# Patient Record
Sex: FEMALE | Race: WHITE | Employment: PART TIME | URBAN - METROPOLITAN AREA
[De-identification: names, ages, dates, MRNs, and addresses within clinical notes are randomized per-mention and may not be internally consistent; named-entity substitution may affect disease eponyms.]

---

## 2024-06-15 ENCOUNTER — HOSPITAL ENCOUNTER (EMERGENCY)
Facility: HOSPITAL | Age: 69
Discharge: HOME | End: 2024-06-15
Attending: EMERGENCY MEDICINE
Payer: MEDICARE

## 2024-06-15 ENCOUNTER — APPOINTMENT (EMERGENCY)
Dept: CT IMAGING | Facility: HOSPITAL | Age: 69
End: 2024-06-15
Payer: MEDICARE

## 2024-06-15 ENCOUNTER — OFFICE VISIT (OUTPATIENT)
Dept: URGENT CARE | Facility: MEDICAL CENTER | Age: 69
End: 2024-06-15
Payer: MEDICARE

## 2024-06-15 VITALS
HEART RATE: 77 BPM | RESPIRATION RATE: 20 BRPM | WEIGHT: 174.16 LBS | SYSTOLIC BLOOD PRESSURE: 110 MMHG | DIASTOLIC BLOOD PRESSURE: 55 MMHG | TEMPERATURE: 97.8 F | OXYGEN SATURATION: 96 %

## 2024-06-15 VITALS
DIASTOLIC BLOOD PRESSURE: 83 MMHG | TEMPERATURE: 98.8 F | HEART RATE: 75 BPM | SYSTOLIC BLOOD PRESSURE: 171 MMHG | OXYGEN SATURATION: 99 % | RESPIRATION RATE: 18 BRPM

## 2024-06-15 DIAGNOSIS — R42 VERTIGO: ICD-10-CM

## 2024-06-15 DIAGNOSIS — R29.90 STROKE-LIKE SYMPTOMS: Primary | ICD-10-CM

## 2024-06-15 DIAGNOSIS — R42 DIZZINESS AND GIDDINESS: Primary | ICD-10-CM

## 2024-06-15 LAB
ANION GAP SERPL CALCULATED.3IONS-SCNC: 6 MMOL/L (ref 4–13)
APTT PPP: 31 SECONDS (ref 23–37)
ATRIAL RATE: 71 BPM
BUN SERPL-MCNC: 18 MG/DL (ref 5–25)
CALCIUM SERPL-MCNC: 9.4 MG/DL (ref 8.4–10.2)
CARDIAC TROPONIN I PNL SERPL HS: <2 NG/L
CHLORIDE SERPL-SCNC: 106 MMOL/L (ref 96–108)
CO2 SERPL-SCNC: 27 MMOL/L (ref 21–32)
CREAT SERPL-MCNC: 0.59 MG/DL (ref 0.6–1.3)
ERYTHROCYTE [DISTWIDTH] IN BLOOD BY AUTOMATED COUNT: 12.8 % (ref 11.6–15.1)
FLUAV RNA RESP QL NAA+PROBE: NEGATIVE
FLUBV RNA RESP QL NAA+PROBE: NEGATIVE
GFR SERPL CREATININE-BSD FRML MDRD: 93 ML/MIN/1.73SQ M
GLUCOSE SERPL-MCNC: 111 MG/DL (ref 65–140)
GLUCOSE SERPL-MCNC: 114 MG/DL (ref 65–140)
HCT VFR BLD AUTO: 41.6 % (ref 34.8–46.1)
HGB BLD-MCNC: 13.6 G/DL (ref 11.5–15.4)
INR PPP: 0.98 (ref 0.84–1.19)
MCH RBC QN AUTO: 28.3 PG (ref 26.8–34.3)
MCHC RBC AUTO-ENTMCNC: 32.7 G/DL (ref 31.4–37.4)
MCV RBC AUTO: 87 FL (ref 82–98)
P AXIS: 63 DEGREES
PLATELET # BLD AUTO: 304 THOUSANDS/UL (ref 149–390)
PMV BLD AUTO: 10.3 FL (ref 8.9–12.7)
POTASSIUM SERPL-SCNC: 3.6 MMOL/L (ref 3.5–5.3)
PR INTERVAL: 170 MS
PROTHROMBIN TIME: 13.2 SECONDS (ref 11.6–14.5)
QRS AXIS: -20 DEGREES
QRSD INTERVAL: 100 MS
QT INTERVAL: 420 MS
QTC INTERVAL: 456 MS
RBC # BLD AUTO: 4.81 MILLION/UL (ref 3.81–5.12)
RSV RNA RESP QL NAA+PROBE: NEGATIVE
SARS-COV-2 RNA RESP QL NAA+PROBE: NEGATIVE
SODIUM SERPL-SCNC: 139 MMOL/L (ref 135–147)
T WAVE AXIS: 39 DEGREES
VENTRICULAR RATE: 71 BPM
WBC # BLD AUTO: 10.09 THOUSAND/UL (ref 4.31–10.16)

## 2024-06-15 PROCEDURE — 85027 COMPLETE CBC AUTOMATED: CPT | Performed by: EMERGENCY MEDICINE

## 2024-06-15 PROCEDURE — 85730 THROMBOPLASTIN TIME PARTIAL: CPT | Performed by: EMERGENCY MEDICINE

## 2024-06-15 PROCEDURE — 70498 CT ANGIOGRAPHY NECK: CPT

## 2024-06-15 PROCEDURE — 85610 PROTHROMBIN TIME: CPT | Performed by: EMERGENCY MEDICINE

## 2024-06-15 PROCEDURE — 82948 REAGENT STRIP/BLOOD GLUCOSE: CPT

## 2024-06-15 PROCEDURE — 93005 ELECTROCARDIOGRAM TRACING: CPT

## 2024-06-15 PROCEDURE — 99285 EMERGENCY DEPT VISIT HI MDM: CPT | Performed by: EMERGENCY MEDICINE

## 2024-06-15 PROCEDURE — 99213 OFFICE O/P EST LOW 20 MIN: CPT | Performed by: PHYSICIAN ASSISTANT

## 2024-06-15 PROCEDURE — 80048 BASIC METABOLIC PNL TOTAL CA: CPT | Performed by: EMERGENCY MEDICINE

## 2024-06-15 PROCEDURE — 93010 ELECTROCARDIOGRAM REPORT: CPT | Performed by: INTERNAL MEDICINE

## 2024-06-15 PROCEDURE — 99284 EMERGENCY DEPT VISIT MOD MDM: CPT

## 2024-06-15 PROCEDURE — G0463 HOSPITAL OUTPT CLINIC VISIT: HCPCS | Performed by: PHYSICIAN ASSISTANT

## 2024-06-15 PROCEDURE — 36415 COLL VENOUS BLD VENIPUNCTURE: CPT | Performed by: EMERGENCY MEDICINE

## 2024-06-15 PROCEDURE — 84484 ASSAY OF TROPONIN QUANT: CPT | Performed by: EMERGENCY MEDICINE

## 2024-06-15 PROCEDURE — 96374 THER/PROPH/DIAG INJ IV PUSH: CPT

## 2024-06-15 PROCEDURE — 70496 CT ANGIOGRAPHY HEAD: CPT

## 2024-06-15 PROCEDURE — 71250 CT THORAX DX C-: CPT

## 2024-06-15 PROCEDURE — 0241U HB NFCT DS VIR RESP RNA 4 TRGT: CPT | Performed by: EMERGENCY MEDICINE

## 2024-06-15 RX ORDER — ONDANSETRON 2 MG/ML
4 INJECTION INTRAMUSCULAR; INTRAVENOUS ONCE
Status: COMPLETED | OUTPATIENT
Start: 2024-06-15 | End: 2024-06-15

## 2024-06-15 RX ORDER — ONDANSETRON 4 MG/1
4 TABLET, ORALLY DISINTEGRATING ORAL EVERY 8 HOURS PRN
Qty: 10 TABLET | Refills: 0 | Status: SHIPPED | OUTPATIENT
Start: 2024-06-15

## 2024-06-15 RX ORDER — MECLIZINE HYDROCHLORIDE 25 MG/1
25 TABLET ORAL ONCE
Status: COMPLETED | OUTPATIENT
Start: 2024-06-15 | End: 2024-06-15

## 2024-06-15 RX ORDER — MECLIZINE HYDROCHLORIDE 25 MG/1
25 TABLET ORAL 3 TIMES DAILY PRN
Qty: 30 TABLET | Refills: 0 | Status: SHIPPED | OUTPATIENT
Start: 2024-06-15

## 2024-06-15 RX ADMIN — IOHEXOL 85 ML: 350 INJECTION, SOLUTION INTRAVENOUS at 11:13

## 2024-06-15 RX ADMIN — MECLIZINE HYDROCHLORIDE 25 MG: 25 TABLET ORAL at 10:56

## 2024-06-15 RX ADMIN — ONDANSETRON 4 MG: 2 INJECTION INTRAMUSCULAR; INTRAVENOUS at 10:56

## 2024-06-15 NOTE — CONSULTS
Consultation - Neurology   Barbara Stiles 68 y.o. female MRN: [id number]  Unit/Bed#: ED-03 Encounter: [id number]      Assessment & Plan     No new Assessment & Plan notes have been filed under this hospital service since the last note was generated.  Service: Neurology        Barbara Stiles will not need outpatient follow up with Neurology. She will not require outpatient neurological testing.    History of Present Illness     Reason for Consult / Principal Problem: dizziness  Hx and PE limited by: none  HPI: Barbara Stiles is a 68 y.o. female who presents with .    Consult to Neurology  Consult performed by: Gustavo Junior MD  Consult ordered by: Eliezer Conrad MD          Review of Systems    Historical Information   History reviewed. No pertinent past medical history.  History reviewed. No pertinent surgical history.  Social History   Social History     Substance and Sexual Activity   Alcohol Use Yes    Alcohol/week: 1.0 standard drink of alcohol    Types: 1 Glasses of wine per week     Social History     Substance and Sexual Activity   Drug Use Not on file     E-Cigarette/Vaping     E-Cigarette/Vaping Substances     Social History     Tobacco Use   Smoking Status Never   Smokeless Tobacco Never     Family History: non-contributory    Review of previous medical records was  completed.     Meds/Allergies   all current active meds have been reviewed, current meds:   No current facility-administered medications for this encounter.   , and PTA meds:   None       No Known Allergies    Objective   Vitals:Blood pressure 110/55, pulse 77, temperature 97.8 °F (36.6 °C), temperature source Oral, resp. rate 20, weight 79 kg (174 lb 2.6 oz), SpO2 96%.,There is no height or weight on file to calculate BMI.  No intake or output data in the 24 hours ending 06/15/24 8598    Invasive Devices:   Invasive Devices       Peripheral Intravenous Line  Duration             Peripheral IV 06/15/24 Left Antecubital <1 day                     Physical Exam  Neurologic Exam    Lab Results: I have personally reviewed pertinent reports.  , CBC:   Results from last 7 days   Lab Units 06/15/24  1038   WBC Thousand/uL 10.09   RBC Million/uL 4.81   HEMOGLOBIN g/dL 13.6   HEMATOCRIT % 41.6   MCV fL 87   PLATELETS Thousands/uL 304   , BMP/CMP:   Results from last 7 days   Lab Units 06/15/24  1038   SODIUM mmol/L 139   POTASSIUM mmol/L 3.6   CHLORIDE mmol/L 106   CO2 mmol/L 27   BUN mg/dL 18   CREATININE mg/dL 0.59*   CALCIUM mg/dL 9.4   EGFR ml/min/1.73sq m 93   , Vitamin B12:   , HgBA1C:   , TSH:   , Coagulation:   Results from last 7 days   Lab Units 06/15/24  1038   INR  0.98   , Lipid Profile:     Imaging Studies: I have personally reviewed pertinent reports.  , I have personally reviewed pertinent films in PACS, and I have personally reviewed pertinent films in PACS with a Radiologist.  EKG, Pathology, and Other Studies: I have personally reviewed pertinent reports.

## 2024-06-15 NOTE — ED PROVIDER NOTES
History  Chief Complaint   Patient presents with    Dizziness     Pt reports feeling like has vertigo. States has had same episodes intermittently the past few days but is now nauseous.      HPI  Patient woke up at about 6:00 and then at 630 when she stood up she noticed that she had extremely bad vertiginous symptoms.  No double vision.  No speech trouble.  She was able to walk.  She seems that things are worse with movement and better with laying down closing her eyes.  Has been having ringing in her ears for months and apparently had her hearing tested years ago which was normal.  She is not complaining of any hearing loss now but the ringing is just continually getting worse.  She has a headache at the top of her head.  There is no weakness or numbness in the arms or legs.  She did have nausea and vomiting when the dizziness gets worse.  She has been having dizziness this whole week which is usually in the morning but then when she is up and around has been resolved.  She does not really go to the doctor and has no history of hypertension or stroke or cardiac disease.  She does not take any medications.  She normally takes her blood pressure and it is in the prehypertension range.  No chest pain or abdominal pain.  None       History reviewed. No pertinent past medical history.    History reviewed. No pertinent surgical history.    History reviewed. No pertinent family history.  I have reviewed and agree with the history as documented.    E-Cigarette/Vaping     E-Cigarette/Vaping Substances     Social History     Tobacco Use    Smoking status: Never    Smokeless tobacco: Never   Substance Use Topics    Alcohol use: Yes     Alcohol/week: 1.0 standard drink of alcohol     Types: 1 Glasses of wine per week       Review of Systems    Physical Exam  Physical Exam  Vitals and nursing note reviewed.   Constitutional:       General: She is not in acute distress.     Appearance: Normal appearance. She is well-developed.  She is not ill-appearing, toxic-appearing or diaphoretic.   HENT:      Head: Normocephalic and atraumatic.      Right Ear: Hearing and tympanic membrane normal. No drainage or swelling.      Left Ear: Hearing and tympanic membrane normal. No drainage or swelling.      Ears:      Comments: Hearing grossly normal with finger rubbing.     Nose: Nose normal.   Eyes:      General: Lids are normal.         Right eye: No discharge.         Left eye: No discharge.      Extraocular Movements: Extraocular movements intact.      Conjunctiva/sclera: Conjunctivae normal.      Pupils: Pupils are equal, round, and reactive to light.      Comments: The patient is center vision looking straight at me it appears that she has some fasciculations in her eyes and she tells me that she feels that I am jumping and that she cannot keep her eyes still.  When I do extraocular movements I do not appreciate any significant nystagmus did not seem to make her dizziness appreciably worse.   Neck:      Vascular: No carotid bruit or JVD.      Trachea: Trachea normal.   Cardiovascular:      Rate and Rhythm: Normal rate and regular rhythm.      Pulses: Normal pulses.      Heart sounds: Normal heart sounds. No murmur heard.     No friction rub. No gallop.   Pulmonary:      Effort: Pulmonary effort is normal. No respiratory distress.      Breath sounds: Normal breath sounds. No stridor. No wheezing or rales.   Chest:      Chest wall: No tenderness.   Abdominal:      Palpations: Abdomen is soft.      Tenderness: There is no abdominal tenderness. There is no guarding or rebound.   Musculoskeletal:         General: Normal range of motion.      Cervical back: Normal range of motion and neck supple.      Right lower leg: No edema.      Left lower leg: No edema.   Skin:     General: Skin is warm and dry.      Coloration: Skin is not pale.      Findings: No rash.   Neurological:      General: No focal deficit present.      Mental Status: She is alert.       GCS: GCS eye subscore is 4. GCS verbal subscore is 5. GCS motor subscore is 6.      Cranial Nerves: No cranial nerve deficit.      Sensory: No sensory deficit.      Motor: No weakness or abnormal muscle tone.      Coordination: Coordination normal.      Gait: Gait normal.      Comments: Patient was able to walk to the bathroom after the alert process was initiated.   Psychiatric:         Mood and Affect: Mood normal.         Speech: Speech normal.         Behavior: Behavior is cooperative.         Vital Signs  ED Triage Vitals   Temperature Pulse Respirations Blood Pressure SpO2   06/15/24 0942 06/15/24 0942 06/15/24 0942 06/15/24 0942 06/15/24 0942   97.8 °F (36.6 °C) 69 16 (!) 190/84 98 %      Temp Source Heart Rate Source Patient Position - Orthostatic VS BP Location FiO2 (%)   06/15/24 0942 06/15/24 0942 06/15/24 0942 06/15/24 0942 --   Oral Monitor Sitting Right arm       Pain Score       06/15/24 1020       8           Vitals:    06/15/24 1330 06/15/24 1430 06/15/24 1500 06/15/24 1600   BP: 143/70 149/75 136/70 113/58   Pulse: 74 73 72 77   Patient Position - Orthostatic VS:    Lying         Visual Acuity  Visual Acuity      Flowsheet Row Most Recent Value   L Pupil Size (mm) 3   R Pupil Size (mm) 3            ED Medications  Medications   meclizine (ANTIVERT) tablet 25 mg (25 mg Oral Given 6/15/24 1056)   ondansetron (ZOFRAN) injection 4 mg (4 mg Intravenous Given 6/15/24 1056)   iohexol (OMNIPAQUE) 350 MG/ML injection (MULTI-DOSE) 85 mL (85 mL Intravenous Given 6/15/24 1113)       Diagnostic Studies  Results Reviewed       Procedure Component Value Units Date/Time    FLU/RSV/COVID - if FLU/RSV clinically relevant [[id number]]  (Normal) Collected: 06/15/24 1038    Lab Status: Final result Specimen: Nares from Nose Updated: 06/15/24 1128     SARS-CoV-2 Negative     INFLUENZA A PCR Negative     INFLUENZA B PCR Negative     RSV PCR Negative    Narrative:      FOR PEDIATRIC PATIENTS - copy/paste COVID Guidelines  URL to browser: [email or web address]    SARS-CoV-2 assay is a Nucleic Acid Amplification assay intended for the  qualitative detection of nucleic acid from SARS-CoV-2 in nasopharyngeal  swabs. Results are for the presumptive identification of SARS-CoV-2 RNA.    Positive results are indicative of infection with SARS-CoV-2, the virus  causing COVID-19, but do not rule out bacterial infection or co-infection  with other viruses. Laboratories within the [place] and its  territories are required to report all positive results to the appropriate  public health authorities. Negative results do not preclude SARS-CoV-2  infection and should not be used as the sole basis for treatment or other  patient management decisions. Negative results must be combined with  clinical observations, patient history, and epidemiological information.  This test has not been FDA cleared or approved.    This test has been authorized by FDA under an Emergency Use Authorization  (EUA). This test is only authorized for the duration of time the  declaration that circumstances exist justifying the authorization of the  emergency use of an in vitro diagnostic tests for detection of SARS-CoV-2  virus and/or diagnosis of COVID-19 infection under section 564(b)(1) of  the Act, 21 U.S.C. 360bbb-3(b)(1), unless the authorization is terminated  or revoked sooner. The test has been validated but independent review by FDA  and CLIA is pending.    Test performed using [employer] GeneXpert: This RT-PCR assay targets N2,  a region unique to SARS-CoV-2. A conserved region in the E-gene was chosen  for pan-Sarbecovirus detection which includes SARS-CoV-2.    According to CMS-2020-01-R, this platform meets the definition of high-throughput technology.    HS Troponin 0hr (reflex protocol) [[id number]]  (Normal) Collected: 06/15/24 1038    Lab Status: Final result Specimen: Blood from Arm, Left Updated:  06/15/24 1107     hs TnI 0hr <2 ng/L     Basic metabolic panel [[id number]]  (Abnormal) Collected: 06/15/24 1038    Lab Status: Final result Specimen: Blood from Arm, Left Updated: 06/15/24 1059     Sodium 139 mmol/L      Potassium 3.6 mmol/L      Chloride 106 mmol/L      CO2 27 mmol/L      ANION GAP 6 mmol/L      BUN 18 mg/dL      Creatinine 0.59 mg/dL      Glucose 114 mg/dL      Calcium 9.4 mg/dL      eGFR 93 ml/min/1.73sq m     Narrative:      National Kidney Disease Foundation guidelines for Chronic Kidney Disease (CKD):     Stage 1 with normal or high GFR (GFR > 90 mL/min/1.73 square meters)    Stage 2 Mild CKD (GFR = 60-89 mL/min/1.73 square meters)    Stage 3A Moderate CKD (GFR = 45-59 mL/min/1.73 square meters)    Stage 3B Moderate CKD (GFR = 30-44 mL/min/1.73 square meters)    Stage 4 Severe CKD (GFR = 15-29 mL/min/1.73 square meters)    Stage 5 End Stage CKD (GFR <15 mL/min/1.73 square meters)  Note: GFR calculation is accurate only with a steady state creatinine    Protime-INR [[id number]]  (Normal) Collected: 06/15/24 1038    Lab Status: Final result Specimen: Blood from Arm, Left Updated: 06/15/24 1058     Protime 13.2 seconds      INR 0.98    APTT [[id number]]  (Normal) Collected: 06/15/24 1038    Lab Status: Final result Specimen: Blood from Arm, Left Updated: 06/15/24 1058     PTT 31 seconds     CBC and Platelet [[id number]]  (Normal) Collected: 06/15/24 1038    Lab Status: Final result Specimen: Blood from Arm, Left Updated: 06/15/24 1044     WBC 10.09 Thousand/uL      RBC 4.81 Million/uL      Hemoglobin 13.6 g/dL      Hematocrit 41.6 %      MCV 87 fL      MCH 28.3 pg      MCHC 32.7 g/dL      RDW 12.8 %      Platelets 304 Thousands/uL      MPV 10.3 fL     Fingerstick Glucose (POCT) [[id number]]  (Normal) Collected: 06/15/24 1038    Lab Status: Final result Specimen: Blood Updated: 06/15/24 1039     POC Glucose 111 mg/dl                    CT chest without contrast   Final Result by Fazal Spring MD  (06/15 1450)      Tiny upper lobe micronodules likely of no clinical significance.               Workstation performed: [id number]         CTA stroke alert (head/neck)   Final Result by Dalton Villalobos MD (06/15 2798)      Negative CTA head and neck for large vessel occlusion, dissection, aneurysm, or high-grade stenosis.      Small patchy micronodular opacities in right upper lobe, suspicious for infection/inflammation. Recommend dedicated CT chest without contrast for further evaluation.      Additional chronic/incidental findings as detailed above.      Findings were directly discussed with Gustavo Junior at approximately 10:51 AM on 6/15/2024.                        Workstation performed: [id number]         CT stroke alert brain   ED Interpretation by Eliezer Conrad MD (06/15 1059)   I have reviewed the film, per my independent interpretation : There is no mass or bleed.  No obvious CVA on this exam.  Formal read per radiology..        Final Result by Dalton Villalobos MD (06/15 1050)      No acute intracranial abnormality.      Mild chronic microangiopathy.      Findings were directly discussed with Gustavo Junior at approximately 10:51 AM on 6/15/2024.      Workstation performed: [id number]                    Procedures  ECG 12 Lead Documentation Only    Date/Time: 6/15/2024 10:45 AM    Performed by: Eliezer Conrad MD  Authorized by: Eliezer Conrad MD    Indications / Diagnosis:  Stroke alert  ECG reviewed by me, the ED Provider: yes    Patient location:  ED  Previous ECG:     Comparison to cardiac monitor: Yes    Interpretation:     Interpretation: normal    Rate:     ECG rate:  71    ECG rate assessment: normal    Rhythm:     Rhythm: sinus rhythm    Ectopy:     Ectopy: none    QRS:     QRS axis:  Normal  Conduction:     Conduction: normal    ST segments:     ST segments:  Normal  T waves:     T waves: normal             ED Course  ED Course as of 06/15/24  1617   Sat Ty 15, 2024   1058 CBC and Platelet  Normal white count no fever to suggest an infection.   1058 Blood Pressure(!): 190/84  Ischial blood pressure was elevated but getting better without medical intervention.   1059 I did speak with the neurologist on-call when the stroke alert was called and discussed therapy at the moment and the patient's current symptoms.   1425 FLU/RSV/COVID - if FLU/RSV clinically relevant  Negative.   1425 Basic metabolic panel(!)  No major electrolyte abnormality.  Normal potassium.  Renal function normal.   1425 hs TnI 0hr: <2  Patient was having no chest pain, EKG was normal.  Negative cardiac marker does not need repeats.   1426 No evidence of arrhythmia on the EKG nor in the room.   1426 Patient is symptomatically better at the moment she was able to walk to the bathroom and went to the CT of the chest for the incidental finding of the possible lung inflammation.  She is having no pulmonary symptoms.  She is feeling better.                  Stroke Assessment       Row Name 06/15/24 1035             NIH Stroke Scale    Interval Baseline      Level of Consciousness (1a.) 0      LOC Questions (1b.) 0      LOC Commands (1c.) 0      Best Gaze (2.) 0      Visual (3.) 0      Facial Palsy (4.) 0      Motor Arm, Left (5a.) 0      Motor Arm, Right (5b.) 0      Motor Leg, Left (6a.) 0      Motor Leg, Right (6b.) 0      Limb Ataxia (7.) 0      Sensory (8.) 0      Best Language (9.) 0      Dysarthria (10.) 0      Extinction and Inattention (11.) (Formerly Neglect) 0      Total 0                    Flowsheet Row Most Recent Value   Thrombolytic Decision Options    Thrombolytic Decision Patient not a candidate.   Patient is not a candidate options comment  [NIH 0. May be peripheral vertigo]                      SBIRT 22yo+      Flowsheet Row Most Recent Value   Initial Alcohol Screen: US AUDIT-C     1. How often do you have a drink containing alcohol? 0 Filed at: 06/15/2024 1020   2. How  many drinks containing alcohol do you have on a typical day you are drinking?  0 Filed at: 06/15/2024 1020   3b. FEMALE Any Age, or MALE 65+: How often do you have 4 or more drinks on one occassion? 0 Filed at: 06/15/2024 1020   Audit-C Score 0 Filed at: 06/15/2024 1020   KEVIN: How many times in the past year have you...    Used an illegal drug or used a prescription medication for non-medical reasons? Never Filed at: 06/15/2024 1020                      Medical Decision Making  Patient's vertiginous symptoms seem like they may be related to BPV with the suddenness of onset and much worse with head turning with its associated symptoms.  She is symptomatically better at the moment.  She was initially called as a stroke alert due to the symptoms plus and oscillatory movement in her eyes with straightahead gaze and that workup was essentially negative.  There is no major abnormalities on the CT or the CTA.  There was an incidental finding in her lungs where the radiologist recommended a scan of the chest and that turns out to be just some nodules which are nonconcerning and do not need any additional follow-up.  She has no major electrolyte abnormalities.  Her initial hypertension I suspect was due to anxiety has greatly improved.  The patient did not want resources for primary care doctor in the [facility]'s system so I gave her the number to [facility]'s info link.  I also placed a referral to PT for vestibular therapy.  I sent prescriptions of Antivert and Zofran to the pharmacy.  Discussion with neurology seems this is very unlikely to be a stroke and more likely to be peripheral etiology.    Amount and/or Complexity of Data Reviewed  Labs: ordered. Decision-making details documented in ED Course.  Radiology: ordered.    Risk  Prescription drug management.             Disposition  Final diagnoses:   Stroke-like symptoms   Vertigo     Time reflects when diagnosis was documented in both MDM as applicable and the  Disposition within this note       Time User Action Codes Description Comment    6/15/2024 10:35 AM Eliezer Conrad [R29.90] Stroke-like symptoms     6/15/2024  4:03 PM Eliezer Conrad [R42] Vertigo           ED Disposition       ED Disposition   Discharge    Condition   Stable    Date/Time   Sat Ty 15, 2024 1603    Comment   Barbara Stiles discharge to home/self care.                   Follow-up Information       Follow up With Specialties Details Why Contact Info    [facility]  Call in 1 week to schedule a follow up with a [facility] Primary Care Doctor. [phone]      Don Bolaños MD Family Medicine Schedule an appointment as soon as possible for a visit in 1 week reevaluation [address] [place] [address]  [phone]              Patient's Medications   Discharge Prescriptions    MECLIZINE (ANTIVERT) 25 MG TABLET    Take 1 tablet (25 mg total) by mouth 3 (three) times a day as needed for dizziness       Start Date: 6/15/2024 End Date: --       Order Dose: 25 mg       Quantity: 30 tablet    Refills: 0    ONDANSETRON (ZOFRAN-ODT) 4 MG DISINTEGRATING TABLET    Take 1 tablet (4 mg total) by mouth every 8 (eight) hours as needed for nausea or vomiting for up to 10 doses       Start Date: 6/15/2024 End Date: --       Order Dose: 4 mg       Quantity: 10 tablet    Refills: 0           PDMP Review       None            ED Provider  Electronically Signed by             Eliezer Conrad MD  06/15/24 3217

## 2024-06-15 NOTE — PROGRESS NOTES
[facility]        NAME: Barbara Stiles is a 68 y.o. female  : 1955    MRN: [id number]  DATE: Maria Fernanda 15, 2024  TIME: 9:23 AM    Assessment and Plan   Dizziness and giddiness [R42]  1. Dizziness and giddiness  Transfer to other facility            Patient Instructions       Emergency room for further evaluation and treatment    If tests have been performed at [facility], our office will contact you with results if changes need to be made to the care plan discussed with you at the visit.  You can review your full results on [facility]    Chief Complaint     Chief Complaint   Patient presents with    Dizziness     Patient c/o nausea, dizziness, and ringing in the ears          History of Present Illness       Patient with some mild dizziness over the last several days but today it has greatly increased.  Her associated tinnitus has also increased and she has had nausea and vomiting.  This increased since awakening this morning.  She has difficulty ambulating.    Dizziness  This is a new problem. The problem occurs constantly. The problem has been rapidly worsening. Associated symptoms include vertigo and a visual change (Blurred photophobia). Pertinent negatives include no abdominal pain, anorexia, arthralgias, chest pain, chills, congestion, coughing, diaphoresis, fatigue, fever, headaches, myalgias, nausea, neck pain, numbness, rash, sore throat, swollen glands, vomiting or weakness. She has tried nothing for the symptoms.       Review of Systems   Review of Systems   Constitutional:  Negative for chills, diaphoresis, fatigue and fever.   HENT:  Negative for congestion and sore throat.    Respiratory:  Negative for cough.    Cardiovascular:  Negative for chest pain.   Gastrointestinal:  Negative for abdominal pain, anorexia, nausea and vomiting.   Musculoskeletal:  Negative for arthralgias, myalgias and neck pain.   Skin:  Negative for rash.   Neurological:  Positive for dizziness and vertigo.  Negative for weakness, numbness and headaches.   All other systems reviewed and are negative.        Current Medications     No current outpatient medications on file.    Current Allergies     Allergies as of 06/15/2024    (No Known Allergies)            The following portions of the patient's history were reviewed and updated as appropriate: allergies, current medications, past family history, past medical history, past social history, past surgical history and problem list.     No past medical history on file.    No past surgical history on file.    No family history on file.      Medications have been verified.        Objective   BP (!) 171/83   Pulse 75   Temp 98.8 °F (37.1 °C)   Resp 18   SpO2 99%   No LMP recorded.       Physical Exam     Physical Exam  Vitals and nursing note reviewed.   Constitutional:       Appearance: Normal appearance. She is normal weight.   HENT:      Right Ear: Tympanic membrane, ear canal and external ear normal.      Left Ear: Tympanic membrane, ear canal and external ear normal.      Nose: Nose normal.      Mouth/Throat:      Mouth: Mucous membranes are moist.   Eyes:      Extraocular Movements: Extraocular movements intact.      Pupils: Pupils are equal, round, and reactive to light.   Cardiovascular:      Rate and Rhythm: Normal rate and regular rhythm.      Pulses: Normal pulses.      Heart sounds: Normal heart sounds.   Pulmonary:      Effort: Pulmonary effort is normal.      Breath sounds: Normal breath sounds.   Neurological:      Mental Status: She is alert.         Mild right beating nystagmus

## 2024-06-15 NOTE — QUICK NOTE
Stroke Alert Note   Barbara Stiles 68 y.o. female  MRN: [id number]   Unit/Bed#: ED-03 Encounter: [id number]     Stroke alert text received at: 10:37am (notification not received through Epic secure chat)  Call from  received at: 10:38am  Neurology response by phone was immediate    68F  Woke up at 6am, and when she first got out of bed, she was off balance and dizzy, but was more mild and worsened as the morning went on.  Vertigo has been constant since then, worsened with movement.  She has been having mild dizziness the last few days, but much worse/different this morning, associated with nausea/vomiting, and has actually had it intermittently for years.  Hypertensive with h/a on arrival  Chronic tinnitus, no hearing loss  Does not see doctors  With midline gaze eyes appear to be jumping around, but no clear nystagmus when eye movements were tested.    NIHSSS 0    CT head wo: ***  CTA head/neck: ***    Thrombolytic decision: {Thrombolytic decision:92389}   ***IV TNK was not given due to ***  - Admit on stroke pathway  - Recommend loading with aspirin 325mg once, then continuing 81 mg daily, and with plavix 300mg once, followed by 75mg daily.  - start lipitor 40mg Qday  - MRI brain wo, echo, lipid panel, HbA1c  - permissive HTN to 220/110 for 24 hours, monitor on telemetry  - normothermia, euglycemia  - avoid hypotonic fluids.    Thrombolytic decision: {Thrombolytic decision:66687}   ***Recommend administering IV TNK, once confirmed to have no contraindications, to treat disabling symptoms thought to be due to stroke within the first 4.5 hours after last known normal.  - Post TNK vitals and neurocheck protocol  - admit on stroke pathway to ICU/critical care for 24 hours  - repeat head CT 24 hours after administering TNK  - no AP or AC prior to above-mentioned repeat head CT  - start lipitor   - MRI brain wo, echo, HbA1c, lipid panel  - monitor on telemetry  - normothermia, euglycemia  - avoid hypotonic  fluids.      Gustavo Junior MD

## 2024-06-15 NOTE — CONSULTS
Consultation - Neurology   Barbara Stiles 68 y.o. female MRN: [id number]  Unit/Bed#: ED-03 Encounter: [id number]      Assessment & Plan     No new Assessment & Plan notes have been filed under this hospital service since the last note was generated.  Service: Neurology    ***    Barbara Stiles will not need outpatient follow up with Neurology. She will not require outpatient neurological testing.    History of Present Illness     Reason for Consult / Principal Problem: dizziness  Hx and PE limited by: none  HPI: Barbara Stiles is a 68 y.o. female who presents with .    Consult to Neurology  Consult performed by: Gustavo Junior MD  Consult ordered by: Eliezer Conrad MD          Review of Systems    Historical Information   History reviewed. No pertinent past medical history.  History reviewed. No pertinent surgical history.  Social History   Social History     Substance and Sexual Activity   Alcohol Use Yes    Alcohol/week: 1.0 standard drink of alcohol    Types: 1 Glasses of wine per week     Social History     Substance and Sexual Activity   Drug Use Not on file     E-Cigarette/Vaping     E-Cigarette/Vaping Substances     Social History     Tobacco Use   Smoking Status Never   Smokeless Tobacco Never     Family History: non-contributory    Review of previous medical records was  completed.     Meds/Allergies   all current active meds have been reviewed, current meds:   No current facility-administered medications for this encounter.   , and PTA meds:   None       No Known Allergies    Objective   Vitals:Blood pressure 110/55, pulse 77, temperature 97.8 °F (36.6 °C), temperature source Oral, resp. rate 20, weight 79 kg (174 lb 2.6 oz), SpO2 96%.,There is no height or weight on file to calculate BMI.  No intake or output data in the 24 hours ending 06/15/24 0496    Invasive Devices:   Invasive Devices       Peripheral Intravenous Line  Duration             Peripheral IV 06/15/24 Left Antecubital <1  day                    Physical Exam  Neurologic Exam    Lab Results: I have personally reviewed pertinent reports.  , CBC:   Results from last 7 days   Lab Units 06/15/24  1038   WBC Thousand/uL 10.09   RBC Million/uL 4.81   HEMOGLOBIN g/dL 13.6   HEMATOCRIT % 41.6   MCV fL 87   PLATELETS Thousands/uL 304   , BMP/CMP:   Results from last 7 days   Lab Units 06/15/24  1038   SODIUM mmol/L 139   POTASSIUM mmol/L 3.6   CHLORIDE mmol/L 106   CO2 mmol/L 27   BUN mg/dL 18   CREATININE mg/dL 0.59*   CALCIUM mg/dL 9.4   EGFR ml/min/1.73sq m 93   , Vitamin B12:   , HgBA1C:   , TSH:   , Coagulation:   Results from last 7 days   Lab Units 06/15/24  1038   INR  0.98   , Lipid Profile:     Imaging Studies: I have personally reviewed pertinent reports.  , I have personally reviewed pertinent films in PACS, and I have personally reviewed pertinent films in PACS with a Radiologist.  EKG, Pathology, and Other Studies: I have personally reviewed pertinent reports.